# Patient Record
Sex: FEMALE | Race: WHITE | NOT HISPANIC OR LATINO
[De-identification: names, ages, dates, MRNs, and addresses within clinical notes are randomized per-mention and may not be internally consistent; named-entity substitution may affect disease eponyms.]

---

## 2021-12-20 PROBLEM — Z00.00 ENCOUNTER FOR PREVENTIVE HEALTH EXAMINATION: Status: ACTIVE | Noted: 2021-12-20

## 2021-12-21 ENCOUNTER — NON-APPOINTMENT (OUTPATIENT)
Age: 66
End: 2021-12-21

## 2021-12-27 ENCOUNTER — NON-APPOINTMENT (OUTPATIENT)
Age: 66
End: 2021-12-27

## 2021-12-27 ENCOUNTER — APPOINTMENT (OUTPATIENT)
Dept: UROLOGY | Facility: CLINIC | Age: 66
End: 2021-12-27
Payer: MEDICARE

## 2021-12-27 ENCOUNTER — TRANSCRIPTION ENCOUNTER (OUTPATIENT)
Age: 66
End: 2021-12-27

## 2021-12-27 VITALS — HEIGHT: 63 IN | TEMPERATURE: 97.3 F | WEIGHT: 134 LBS | BODY MASS INDEX: 23.74 KG/M2

## 2021-12-27 DIAGNOSIS — Z80.0 FAMILY HISTORY OF MALIGNANT NEOPLASM OF DIGESTIVE ORGANS: ICD-10-CM

## 2021-12-27 DIAGNOSIS — Z80.3 FAMILY HISTORY OF MALIGNANT NEOPLASM OF BREAST: ICD-10-CM

## 2021-12-27 DIAGNOSIS — Z82.49 FAMILY HISTORY OF ISCHEMIC HEART DISEASE AND OTHER DISEASES OF THE CIRCULATORY SYSTEM: ICD-10-CM

## 2021-12-27 DIAGNOSIS — Z78.9 OTHER SPECIFIED HEALTH STATUS: ICD-10-CM

## 2021-12-27 PROCEDURE — 99203 OFFICE O/P NEW LOW 30 MIN: CPT

## 2021-12-27 RX ORDER — TERBINAFINE HYDROCHLORIDE 250 MG/1
250 TABLET ORAL
Qty: 30 | Refills: 0 | Status: ACTIVE | COMMUNITY
Start: 2021-12-25

## 2021-12-27 RX ORDER — LEVOFLOXACIN 500 MG/1
500 TABLET, FILM COATED ORAL
Qty: 3 | Refills: 0 | Status: ACTIVE | COMMUNITY
Start: 2021-12-15

## 2021-12-27 RX ORDER — FLUTICASONE PROPIONATE 50 UG/1
50 SPRAY, METERED NASAL
Qty: 16 | Refills: 0 | Status: ACTIVE | COMMUNITY
Start: 2021-12-25

## 2021-12-27 RX ORDER — CEFDINIR 300 MG/1
300 CAPSULE ORAL
Qty: 20 | Refills: 0 | Status: ACTIVE | COMMUNITY
Start: 2021-12-02

## 2021-12-27 NOTE — ASSESSMENT
[FreeTextEntry1] : 1 gross hematuria\par 2 resolved mild irritative symptoms.\par \par We discussed these issues in detail including her UA and culture results.  I sent the urine for cytology and after discussion with her we will obtain a CT urogram without and with contrast followed by a possible cystoscopy depending upon the CT findings.  All her questions were otherwise answered.

## 2021-12-27 NOTE — HISTORY OF PRESENT ILLNESS
[FreeTextEntry1] : 66-year-old female who had an episode of gross hematuria.  She was taking terbinafine and she also had some mild vague symptoms but no definitive flank pain.  Urinalysis at the time showed 20-40 white blood cells but was nitrite negative and with no bacteria seen.  There was 3+ occult blood but only 0-2 red cells per high-power field.  Final culture was negative.  She comes in for further evaluation.  After stopping the terbinafine her mildly irritative symptoms have actually improved.  She does have some urinary frequency and urgency and minimal stress incontinence.  There is no prior history of tobacco or smoking.\par She is otherwise quite healthy.

## 2021-12-27 NOTE — PHYSICAL EXAM
[General Appearance - Well Developed] : well developed [Normal Appearance] : normal appearance [Well Groomed] : well groomed [General Appearance - In No Acute Distress] : no acute distress [Abdomen Soft] : soft [Abdomen Tenderness] : non-tender [] : no hepato-splenomegaly [Abdomen Mass (___ Cm)] : no abdominal mass palpated [Abdomen Hernia] : no hernia was discovered [Costovertebral Angle Tenderness] : no ~M costovertebral angle tenderness

## 2022-01-03 LAB — URINE CYTOLOGY: NORMAL

## 2022-01-09 ENCOUNTER — RESULT REVIEW (OUTPATIENT)
Age: 67
End: 2022-01-09

## 2022-01-09 ENCOUNTER — OUTPATIENT (OUTPATIENT)
Dept: OUTPATIENT SERVICES | Facility: HOSPITAL | Age: 67
LOS: 1 days | Discharge: HOME | End: 2022-01-09
Payer: MEDICARE

## 2022-01-09 DIAGNOSIS — R31.0 GROSS HEMATURIA: ICD-10-CM

## 2022-01-09 PROCEDURE — 74178 CT ABD&PLV WO CNTR FLWD CNTR: CPT | Mod: 26,MH

## 2022-01-10 ENCOUNTER — TRANSCRIPTION ENCOUNTER (OUTPATIENT)
Age: 67
End: 2022-01-10

## 2022-01-11 ENCOUNTER — APPOINTMENT (OUTPATIENT)
Dept: UROLOGY | Facility: CLINIC | Age: 67
End: 2022-01-11
Payer: MEDICARE

## 2022-01-11 LAB
BILIRUB UR QL STRIP: NORMAL
COLLECTION METHOD: NORMAL
GLUCOSE UR-MCNC: NORMAL
HCG UR QL: 0.2 EU/DL
HGB UR QL STRIP.AUTO: NORMAL
KETONES UR-MCNC: NORMAL
LEUKOCYTE ESTERASE UR QL STRIP: NORMAL
NITRITE UR QL STRIP: NORMAL
PH UR STRIP: 7
PROT UR STRIP-MCNC: NORMAL
SP GR UR STRIP: 1.01

## 2022-01-11 PROCEDURE — 52000 CYSTOURETHROSCOPY: CPT

## 2022-01-11 PROCEDURE — 81003 URINALYSIS AUTO W/O SCOPE: CPT | Mod: QW

## 2022-03-22 ENCOUNTER — OUTPATIENT (OUTPATIENT)
Dept: OUTPATIENT SERVICES | Facility: HOSPITAL | Age: 67
LOS: 1 days | Discharge: HOME | End: 2022-03-22

## 2022-03-22 DIAGNOSIS — R07.9 CHEST PAIN, UNSPECIFIED: ICD-10-CM

## 2022-04-21 ENCOUNTER — OUTPATIENT (OUTPATIENT)
Dept: OUTPATIENT SERVICES | Facility: HOSPITAL | Age: 67
LOS: 1 days | Discharge: HOME | End: 2022-04-21
Payer: MEDICARE

## 2022-04-21 DIAGNOSIS — R07.9 CHEST PAIN, UNSPECIFIED: ICD-10-CM

## 2022-04-21 PROCEDURE — 75574 CT ANGIO HRT W/3D IMAGE: CPT | Mod: 26,MH

## 2023-01-13 ENCOUNTER — APPOINTMENT (OUTPATIENT)
Dept: UROLOGY | Facility: CLINIC | Age: 68
End: 2023-01-13
Payer: MEDICARE

## 2023-01-13 VITALS — HEIGHT: 63 IN | BODY MASS INDEX: 23.74 KG/M2 | WEIGHT: 134 LBS

## 2023-01-13 PROCEDURE — 99212 OFFICE O/P EST SF 10 MIN: CPT

## 2023-01-16 LAB
BILIRUB UR QL STRIP: NORMAL
COLLECTION METHOD: NORMAL
GLUCOSE UR-MCNC: NORMAL
HCG UR QL: 0.2 EU/DL
HGB UR QL STRIP.AUTO: NORMAL
KETONES UR-MCNC: NORMAL
LEUKOCYTE ESTERASE UR QL STRIP: NORMAL
NITRITE UR QL STRIP: NORMAL
PH UR STRIP: 6.5
PROT UR STRIP-MCNC: NORMAL
SP GR UR STRIP: 1.01

## 2023-01-16 NOTE — HISTORY OF PRESENT ILLNESS
[FreeTextEntry1] : Ms Coates is a 67 year old female being followed for a history of gross hematuria. She underwent a hematuria workup one year ago which was essentially normal. She has been doing well. Denies any hematuria, dysuria, or worsening urinary urgency or frequency. She is not currently bothered by the urinary frequency and urgency.

## 2023-01-16 NOTE — ASSESSMENT
[FreeTextEntry1] : Ms. Coates is a 67 year old female with a history of gross hematuria. She had a full workup last year and this was reviewed. She has not had any more episodes of hematuria and has been doing well. Her urine dip today shows a trace of blood. We will send that out for a microscopic UA. If this comes back positive for RBCs we will contact her. Otherwise, she will follow up with her PCP for a UA in 1 year. All of her questions were otherwise answered.

## 2023-10-23 ENCOUNTER — NON-APPOINTMENT (OUTPATIENT)
Age: 68
End: 2023-10-23

## 2023-11-27 ENCOUNTER — APPOINTMENT (OUTPATIENT)
Dept: UROLOGY | Facility: CLINIC | Age: 68
End: 2023-11-27
Payer: MEDICARE

## 2023-11-27 VITALS
TEMPERATURE: 97.6 F | HEART RATE: 75 BPM | SYSTOLIC BLOOD PRESSURE: 118 MMHG | DIASTOLIC BLOOD PRESSURE: 72 MMHG | RESPIRATION RATE: 16 BRPM | OXYGEN SATURATION: 98 % | WEIGHT: 134 LBS | BODY MASS INDEX: 23.74 KG/M2 | HEIGHT: 63 IN

## 2023-11-27 PROCEDURE — 99214 OFFICE O/P EST MOD 30 MIN: CPT

## 2023-11-30 LAB
BILIRUB UR QL STRIP: NORMAL
COLLECTION METHOD: NORMAL
GLUCOSE UR-MCNC: NORMAL
HCG UR QL: 0.2 EU/DL
HGB UR QL STRIP.AUTO: NORMAL
KETONES UR-MCNC: NORMAL
LEUKOCYTE ESTERASE UR QL STRIP: NORMAL
NITRITE UR QL STRIP: NORMAL
PH UR STRIP: 6
PROT UR STRIP-MCNC: NORMAL
SP GR UR STRIP: 1.01

## 2023-12-04 LAB — URINE CYTOLOGY: NORMAL

## 2024-01-05 ENCOUNTER — APPOINTMENT (OUTPATIENT)
Dept: UROLOGY | Facility: CLINIC | Age: 69
End: 2024-01-05
Payer: MEDICARE

## 2024-01-05 PROCEDURE — 52000 CYSTOURETHROSCOPY: CPT

## 2024-04-03 ENCOUNTER — OUTPATIENT (OUTPATIENT)
Dept: OUTPATIENT SERVICES | Facility: HOSPITAL | Age: 69
LOS: 1 days | End: 2024-04-03
Payer: MEDICARE

## 2024-04-03 ENCOUNTER — RESULT REVIEW (OUTPATIENT)
Age: 69
End: 2024-04-03

## 2024-04-03 DIAGNOSIS — R31.0 GROSS HEMATURIA: ICD-10-CM

## 2024-04-03 DIAGNOSIS — Z00.8 ENCOUNTER FOR OTHER GENERAL EXAMINATION: ICD-10-CM

## 2024-04-03 PROCEDURE — 76770 US EXAM ABDO BACK WALL COMP: CPT | Mod: 26

## 2024-04-03 PROCEDURE — 76770 US EXAM ABDO BACK WALL COMP: CPT

## 2024-04-04 DIAGNOSIS — R31.0 GROSS HEMATURIA: ICD-10-CM

## 2024-04-15 ENCOUNTER — APPOINTMENT (OUTPATIENT)
Dept: UROLOGY | Facility: CLINIC | Age: 69
End: 2024-04-15
Payer: MEDICARE

## 2024-04-15 DIAGNOSIS — R31.0 GROSS HEMATURIA: ICD-10-CM

## 2024-04-15 DIAGNOSIS — N30.20 OTHER CHRONIC CYSTITIS W/OUT HEMATURIA: ICD-10-CM

## 2024-04-15 PROCEDURE — 99213 OFFICE O/P EST LOW 20 MIN: CPT

## 2024-04-15 PROCEDURE — G2211 COMPLEX E/M VISIT ADD ON: CPT

## 2024-04-16 NOTE — HISTORY OF PRESENT ILLNESS
[FreeTextEntry1] : 68 year old female patient seen in follow-up for history of recurrent UTI although she has not had positive cultures. She has been scoped and had a urine cytology which was negative, as well as CT which showed air in her bladder. It is unclear the cause of her discomfort and hematuria. It is unclear whether she is passing crystals. She had an essentially normal ultrasound on 4/3/24. Her urine today is clear.   Cystoscopy on 1/5/24:  Essentially normal cystoscopy  Renal and Bladder Ultrasound done on 4/3/24:  Normal renal ultrasound. No significant urinary bladder retention.

## 2024-04-16 NOTE — ADDENDUM
[FreeTextEntry1] :  REILLY SANCHEZ, am scribing for and in the presence of  in the following sections: HISTORY OF PRESENT ILLNESS, PAST MEDICAL/FAMILY/SOCIAL HISTORY, REVIEW OF SYSTEMS, VITAL SIGNS, PHYSICAL EXAM, ASSESSMENT/PLAN on 04/15/2024.

## 2024-04-16 NOTE — ASSESSMENT
[FreeTextEntry1] :  68 year old female patient with: 1) history of hematuria; 2) possible passage of crystals/stones or UTIs. I have given her an order and a cup to get a culture if she has any symptoms. I suggested keeping an urine output of 2L daily, keeping away from dark laura, and to add a tablespoon of lemon juice to a glass of water 2-3x a day for stone prevention. We will plan to see her back in 6 months with a urinalysis and post void residual.    The submitted E/M billing level for this visit reflects the total time spent on the day of the visit including face-to-face time spent with the patient, non-face-to-face review of medical records and relevant information, documentation, and asynchronous communication with the patient after a visit via phone, email, or patients EHR portal after the visit. The medical records reviewed are either scanned into the chart or reviewed with the patient using a patients electronic medical records portal for patients with records not available to [facility] via electronic transmission platforms from other institutions and labs. Time spent counseling and performing coordination of care was also included in determining the appropriate EM billing level.   I have reviewed and verified information regarding the chief complaint and history recorded by the ancillary staff and/or the patient. I have independently reviewed and interpreted tests performed by other physicians and facilities as necessary.   I have discussed with the patient differential diagnosis, reason for auxiliary tests if ordered, risks, benefits, alternatives, and complications of each form of therapy were discussed.  I personally performed the services described in the documentation, reviewed the documentation recorded by the scribe in my presence, and it accurately and completely records my words and actions.

## 2024-04-17 PROBLEM — N30.20 CYSTITIS, CHRONIC: Status: ACTIVE | Noted: 2023-11-30

## 2024-04-17 PROBLEM — R31.0 GROSS HEMATURIA: Status: ACTIVE | Noted: 2021-12-27

## 2024-04-30 ENCOUNTER — APPOINTMENT (OUTPATIENT)
Dept: UROLOGY | Facility: CLINIC | Age: 69
End: 2024-04-30

## 2024-10-15 ENCOUNTER — APPOINTMENT (OUTPATIENT)
Dept: UROLOGY | Facility: CLINIC | Age: 69
End: 2024-10-15
Payer: MEDICARE

## 2024-10-15 DIAGNOSIS — R31.0 GROSS HEMATURIA: ICD-10-CM

## 2024-10-15 DIAGNOSIS — N30.20 OTHER CHRONIC CYSTITIS W/OUT HEMATURIA: ICD-10-CM

## 2024-10-15 PROCEDURE — 51798 US URINE CAPACITY MEASURE: CPT

## 2024-10-15 PROCEDURE — G2211 COMPLEX E/M VISIT ADD ON: CPT

## 2024-10-15 PROCEDURE — 99213 OFFICE O/P EST LOW 20 MIN: CPT

## 2025-02-16 ENCOUNTER — NON-APPOINTMENT (OUTPATIENT)
Age: 70
End: 2025-02-16

## 2025-03-01 ENCOUNTER — NON-APPOINTMENT (OUTPATIENT)
Age: 70
End: 2025-03-01

## 2025-04-14 ENCOUNTER — APPOINTMENT (OUTPATIENT)
Dept: UROLOGY | Facility: CLINIC | Age: 70
End: 2025-04-14
Payer: MEDICARE

## 2025-04-14 ENCOUNTER — NON-APPOINTMENT (OUTPATIENT)
Age: 70
End: 2025-04-14

## 2025-04-14 VITALS
DIASTOLIC BLOOD PRESSURE: 68 MMHG | BODY MASS INDEX: 23.74 KG/M2 | WEIGHT: 134 LBS | SYSTOLIC BLOOD PRESSURE: 113 MMHG | HEIGHT: 63 IN | RESPIRATION RATE: 17 BRPM | OXYGEN SATURATION: 99 % | HEART RATE: 88 BPM | TEMPERATURE: 98.4 F

## 2025-04-14 DIAGNOSIS — N30.20 OTHER CHRONIC CYSTITIS W/OUT HEMATURIA: ICD-10-CM

## 2025-04-14 DIAGNOSIS — R31.0 GROSS HEMATURIA: ICD-10-CM

## 2025-04-14 PROCEDURE — 51798 US URINE CAPACITY MEASURE: CPT

## 2025-04-14 PROCEDURE — G2211 COMPLEX E/M VISIT ADD ON: CPT

## 2025-04-14 PROCEDURE — 99213 OFFICE O/P EST LOW 20 MIN: CPT

## 2025-04-22 LAB — URINE CYTOLOGY: NORMAL
